# Patient Record
Sex: FEMALE | Race: WHITE | Employment: UNEMPLOYED | URBAN - METROPOLITAN AREA
[De-identification: names, ages, dates, MRNs, and addresses within clinical notes are randomized per-mention and may not be internally consistent; named-entity substitution may affect disease eponyms.]

---

## 2019-03-29 ENCOUNTER — HOSPITAL ENCOUNTER (EMERGENCY)
Facility: CLINIC | Age: 44
Discharge: HOME | End: 2019-03-29
Attending: EMERGENCY MEDICINE | Admitting: EMERGENCY MEDICINE
Payer: COMMERCIAL

## 2019-03-29 VITALS
BODY MASS INDEX: 28.35 KG/M2 | OXYGEN SATURATION: 100 % | HEART RATE: 86 BPM | RESPIRATION RATE: 18 BRPM | DIASTOLIC BLOOD PRESSURE: 71 MMHG | TEMPERATURE: 97.8 F | WEIGHT: 160 LBS | SYSTOLIC BLOOD PRESSURE: 127 MMHG | HEIGHT: 63 IN

## 2019-03-29 DIAGNOSIS — F41.9 ANXIETY: ICD-10-CM

## 2019-03-29 PROCEDURE — 25000132 ZZH RX MED GY IP 250 OP 250 PS 637: Performed by: EMERGENCY MEDICINE

## 2019-03-29 PROCEDURE — 99283 EMERGENCY DEPT VISIT LOW MDM: CPT

## 2019-03-29 RX ORDER — HYDROXYZINE HYDROCHLORIDE 25 MG/1
50 TABLET, FILM COATED ORAL ONCE
Status: COMPLETED | OUTPATIENT
Start: 2019-03-29 | End: 2019-03-29

## 2019-03-29 RX ORDER — HYDROXYZINE HYDROCHLORIDE 25 MG/1
25-50 TABLET, FILM COATED ORAL EVERY 6 HOURS PRN
Qty: 20 TABLET | Refills: 0 | Status: SHIPPED | OUTPATIENT
Start: 2019-03-29

## 2019-03-29 RX ADMIN — HYDROXYZINE HYDROCHLORIDE 50 MG: 25 TABLET ORAL at 04:42

## 2019-03-29 ASSESSMENT — ENCOUNTER SYMPTOMS
NERVOUS/ANXIOUS: 1
COUGH: 0
CHILLS: 0
FEVER: 0

## 2019-03-29 ASSESSMENT — MIFFLIN-ST. JEOR: SCORE: 1349.89

## 2019-03-29 NOTE — ED AVS SNAPSHOT
Emergency Department  [address] [place] [address]  Phone:  [phone]  Fax:  [phone]                                    Veena Squires   MRN: [id number]    Department:   Emergency Department   Date of Visit:  3/29/2019           After Visit Summary Signature Page    I have received my discharge instructions, and my questions have been answered. I have discussed any challenges I see with this plan with the nurse or doctor.    ..........................................................................................................................................  Patient/Patient Representative Signature      ..........................................................................................................................................  Patient Representative Print Name and Relationship to Patient    ..................................................               ................................................  Date                                   Time    ..........................................................................................................................................  Reviewed by Signature/Title    ...................................................              ..............................................  Date                                               Time          22EPIC Rev 08/18

## 2019-03-29 NOTE — ED PROVIDER NOTES
"  History     Chief Complaint:    Anxiety      HPI   Veena Squires is a 44 year old female with a history of anxiety, bipolar 1 disorder, depression, PTSD, and multiple personalities who presents to the ED for evaluation of anxiety. The patient states that she has recently increased her Olanzapine dosage and is now taking it twice a day. She also started taking Paxil two days ago and has been experiencing increased anxiety since then in addition to \"weird thoughts.\" She also complains that for several months she has been getting sick when she goes outside in addition to feeling light headed and developing a metallic taste in her mouth because of \"chem trails.\" She otherwise feels fine when she is indoors. She denies any suicidal ideation, homicidal ideation, cough, fever, chills, or recent illnesses.     Allergies:  The patient has no known drug allergies.    Medications:    Gabapentin  Trazodone  Hydroxyzine  Olanzapine    Past Medical History:    Anxiety  Bipolar 1 disorder  Depression  Multiple personality   PTSD    Past Surgical History:    Lip lift    Family History:    No past pertinent family history.    Social History:  Negative for tobacco use.  Negative for alcohol use.  Marital Status:  Single [1]     Review of Systems   Constitutional: Negative for chills and fever.   Respiratory: Negative for cough.    Psychiatric/Behavioral: Negative for suicidal ideas. The patient is nervous/anxious.    All other systems reviewed and are negative.        Physical Exam   First Vitals:  BP: 127/71  Pulse: 86  Temp: 97.8  F (36.6  C)  Resp: 18  Height: 160 cm (5' 3\")  Weight: 72.6 kg (160 lb)  SpO2: 100 %      Physical Exam  Constitutional:  Appears well-developed and well-nourished. Cooperative. Awake alert. Good eye contact. Answers questions appropriately. Mildly disheveled.   HENT:   Head:    Atraumatic.   Mouth/Throat:   Oropharynx is without erythema or exudate and mucous     membranes are moist.   Eyes: " "   Conjunctivae normal and EOM are normal.      Pupils are equal, round, and reactive to light.   Neck:    Normal range of motion. Neck supple.   Cardiovascular:  Normal rate, regular rhythm, normal heart sounds and radial and    dorsalis pedis pulses are 2+ and symmetric.    Pulmonary/Chest:  Effort normal and breath sounds normal.   Abdominal:   Soft. Bowel sounds are normal.      No splenomegaly or hepatomegaly. No tenderness. No rebound.   Musculoskeletal:  Normal range of motion. No edema and no tenderness.   Neurological:  Alert. Normal strength. No cranial nerve deficit. Gait normal.   Skin:    Skin is warm and dry.   Psychiatric:   Normal speech. Disheveled. Good eye contact. Normal mood and flat affect. Does not appear significantly anxious.     Emergency Department Course   Interventions:  0442 Atarax 50 mg PO    Emergency Department Course:  Nursing notes and vitals reviewed. (0404) I performed an exam of the patient as documented above.     Medicine administered as documented above.     I rechecked the patient and discussed the results of her workup thus far.     Findings and plan explained to the Patient. Patient discharged home with instructions regarding supportive care, medications, and reasons to return. The importance of close follow-up was reviewed. The patient was prescribed Atarax.    Impression & Plan    Medical Decision Making:  Patient presents complaining of anxiety that has worsened over the past couple of days since she started taking Paxil in addition to her other psych medications. She denies any new suicidal or homicidal ideation. She said she is not having any hallucinations. She does seem to have a fixed delusion about air planes spraying chemicals into the air, and she reports that when she sees \"chem trails\" in the [place] it causes her to cough and develop a metallic taste in her mouth. This is something that she has been dealing with for many months. It does not seem to be an acute " psychosis and she has been working on it with her psychiatrist. I don't think further emergent work up of her delusion is indicated. She declines the need to talk to psychiatry.     Here in the ED she speaks clearly and answers my questions appropriately. She is pleasant and cooperative. She does not seem to be in a panic currently. I told her we can given medications to help ease anxiety symptoms until her system starts to become accustomed to the Paxil. She thinks this is reasonable to try. She is given some Atarax here and a prescription for Atarax for home. She will call her psychiatrist tomorrow to discuss her symptoms. At this point she does not want to discontinue because she had been having some increasing problems with depression and anxiety and that was why this medication was started.    She contracts for safety and agrees to return to the ED for any concerning symptoms. She left in the ED in good condition.       Diagnosis:    ICD-10-CM    1. Anxiety F41.9        Disposition:  discharged to home    Discharge Medications:     Medication List      Started    hydrOXYzine 25 MG tablet  Commonly known as:  ATARAX  25-50 mg, Oral, EVERY 6 HOURS PRN          Scribe Disclosure:  I,  Luis Smith, am serving as a scribe on 3/29/2019 at 4:04 AM to personally document services performed by Jeremy Genao MD based on my observations and the provider's statements to me.          Luis Smith  3/29/2019    EMERGENCY DEPARTMENT       Jeremy Genao MD  03/30/19 0711

## 2019-09-17 ENCOUNTER — HOSPITAL ENCOUNTER (EMERGENCY)
Facility: CLINIC | Age: 44
Discharge: HOME | End: 2019-09-17
Attending: EMERGENCY MEDICINE | Admitting: EMERGENCY MEDICINE
Payer: COMMERCIAL

## 2019-09-17 VITALS
TEMPERATURE: 98.2 F | SYSTOLIC BLOOD PRESSURE: 124 MMHG | HEART RATE: 78 BPM | DIASTOLIC BLOOD PRESSURE: 92 MMHG | RESPIRATION RATE: 16 BRPM | OXYGEN SATURATION: 99 %

## 2019-09-17 DIAGNOSIS — F43.10 PTSD (POST-TRAUMATIC STRESS DISORDER): ICD-10-CM

## 2019-09-17 DIAGNOSIS — F41.9 ANXIETY: ICD-10-CM

## 2019-09-17 LAB — INTERPRETATION ECG - MUSE: NORMAL

## 2019-09-17 PROCEDURE — 93005 ELECTROCARDIOGRAM TRACING: CPT

## 2019-09-17 PROCEDURE — 99283 EMERGENCY DEPT VISIT LOW MDM: CPT

## 2019-09-17 PROCEDURE — 25000132 ZZH RX MED GY IP 250 OP 250 PS 637: Performed by: EMERGENCY MEDICINE

## 2019-09-17 RX ORDER — LORAZEPAM 0.5 MG/1
0.5 TABLET ORAL ONCE
Status: COMPLETED | OUTPATIENT
Start: 2019-09-17 | End: 2019-09-17

## 2019-09-17 RX ADMIN — LORAZEPAM 0.5 MG: 0.5 TABLET ORAL at 03:29

## 2019-09-17 ASSESSMENT — ENCOUNTER SYMPTOMS
HALLUCINATIONS: 1
PALPITATIONS: 1
NERVOUS/ANXIOUS: 1

## 2019-09-17 NOTE — ED AVS SNAPSHOT
[facility] Emergency Department  [address]  [facility] [address]  Phone:  [phone]  Fax:  [phone]                                    Veena Squires   MRN: [id number]    Department:  [facility] Emergency Department   Date of Visit:  9/17/2019           After Visit Summary Signature Page    I have received my discharge instructions, and my questions have been answered. I have discussed any challenges I see with this plan with the nurse or doctor.    ..........................................................................................................................................  Patient/Patient Representative Signature      ..........................................................................................................................................  Patient Representative Print Name and Relationship to Patient    ..................................................               ................................................  Date                                   Time    ..........................................................................................................................................  Reviewed by Signature/Title    ...................................................              ..............................................  Date                                               Time          22EPIC Rev 08/18

## 2019-09-17 NOTE — ED TRIAGE NOTES
Pt states she has a hx of anxiety, depression, and PTSD from her childhood.  Pt states she was recently in [place] and got lost and\ is feeling very anxious about that.  Also recently had a medication dose adjustment which is making her feel anxious.  Pt also states she is having heart palpitations.

## 2019-09-17 NOTE — ED PROVIDER NOTES
"  History     Chief Complaint:  Anxiety    HPI   Veena Squires is a 44 year old female with a history of anxiety, depression and PTSD who presents with anxiety. The patient says that she has been having anxiety for some time now, but it has gotten worse recently. She says that the anxiety got worse after having flashbacks of getting lost in [place] recently and childhood trauma. She says that her anxiety/depression medications got increased recently, but she is unable to remember which one. She endorses the use of gabapentin, olanzapine, hydroxyzine, and trazodone. She also notes that her heart was \"beating funny\". She says that she has been having some auditory hallucinations, but they have not been telling her to harm herself or others. The patient denies any visual hallucinations or thoughts of self harm because she is \"too scared to do that\". She also notes that she would prefer to \"not wake up\" but again states she'd never act on this and has a good relationship with her mental health team who provide good care.     Allergies:  No Known Drug Allergies     Medications:    Atarax   Trazodone  Gabapentin   Olanzapine     Past Medical History:    Anxiety  Bipolar 1 disorder  Depressive disorder  Multiple personality  PTSD  Female hypogonadism  IBS  Vaginal stenosis    Past Surgical History:    Past Surgical History:   Procedure Laterality Date     COSMETIC SURGERY - lip lift  2009     MAMMOPLASTY AUGMENTATION       VAGINOPLASTY       Family History:    Family history reviewed. No pertinent family history.     Social History:  Smoking Status: Never Smoker  Alcohol Use: Negative  Drug Use: Negative  PCP: Shannon Vogel   Marital Status:  Single      Review of Systems   Cardiovascular: Positive for palpitations.   Psychiatric/Behavioral: Positive for hallucinations. Negative for self-injury. The patient is nervous/anxious.    All other systems reviewed and are negative.      Physical Exam     Patient Vitals " for the past 24 hrs:  BP Temp Temp src Pulse Heart Rate Resp SpO2   124/92 98.2  F (36.8  C) Oral 78 78 16 98 %        Physical Exam  Nursing note and vitals reviewed.  Constitutional: Cooperative. Patient is somewhat tangential, but redirectable.   HENT:   Mouth/Throat: Mucous membranes are normal.   Cardiovascular: Normal rate, regular rhythm and normal heart sounds.  No murmur.  Pulmonary/Chest: Effort normal and breath sounds normal. No respiratory distress. No wheezes. No rales.   Abdominal: Normal appearance  Neurological: Alert. Oriented x4.  Gait and strength normal.   Skin: Skin is warm and dry. No rash noted.   Psychiatric: Anxious mood and affect.  No suicidal ideation or homicidal. She describes chronic auditory hallucinations, good insight.    Emergency Department Course     Interventions:  0329 Ativan 0.5 mg Oral     Emergency Department Course:    0302 Nursing notes and vitals reviewed.    0305 I performed an exam of the patient as documented above.     0405 Patient rechecked and updated.  Patient is feeling better and wants to go home.     0425 The patient is discharged to home.     Impression & Plan    Medical Decision Making:  Veena Squires is a 44 year old female with the above history who presents with anxiety symptoms. She has a complex medical history and what sounds like stable psychosis symptoms that are chronic. She interestingly has good insight into this acknowledging that the auditory hallucinations are always there. There is no command hallucinations. She clearly denies thoughts of intent to commit self harm. She is depressed but seeing a therapist. She does not meet criteria for involuntary hold as she is able to contract for safety here. Sounds like she had some flashbacks and PTSD secondary to getting lost in [place] during a recent road trip as well as being pulled over by police. After a small dose of oral Ativan she feels much better and wants to go home. I think this is  reasonable. The patient is appreciative of the care she received and will be discharged home in stable condition.         Diagnosis:    ICD-10-CM    1. Anxiety F41.9    2. PTSD (post-traumatic stress disorder) F43.10      Disposition:   Findings and plan explained to the Patient. Patient discharged home with instructions regarding supportive care, medications, and reasons to return. The importance of close follow-up was reviewed.     Discharge Medications:  No discharge medications.      Scribe Disclosure:  MYA, Eduardo Shah, am serving as a scribe at 3:04 AM on 9/17/2019 to document services personally performed by Lauri Ku MD based on my observations and the provider's statements to me.      [facility] EMERGENCY DEPARTMENT       Lauri Ku MD  09/17/19 0600

## 2020-02-11 ENCOUNTER — HOSPITAL ENCOUNTER (EMERGENCY)
Facility: CLINIC | Age: 45
Discharge: HOME | End: 2020-02-11
Attending: EMERGENCY MEDICINE | Admitting: EMERGENCY MEDICINE
Payer: COMMERCIAL

## 2020-02-11 VITALS — OXYGEN SATURATION: 99 % | TEMPERATURE: 98 F | HEART RATE: 92 BPM | RESPIRATION RATE: 18 BRPM

## 2020-02-11 DIAGNOSIS — F41.9 ANXIETY: ICD-10-CM

## 2020-02-11 DIAGNOSIS — R44.0 AUDITORY HALLUCINATION: ICD-10-CM

## 2020-02-11 PROCEDURE — 90791 PSYCH DIAGNOSTIC EVALUATION: CPT

## 2020-02-11 PROCEDURE — 25000132 ZZH RX MED GY IP 250 OP 250 PS 637: Performed by: EMERGENCY MEDICINE

## 2020-02-11 PROCEDURE — 99285 EMERGENCY DEPT VISIT HI MDM: CPT | Mod: 25

## 2020-02-11 RX ORDER — LORAZEPAM 1 MG/1
1 TABLET ORAL ONCE
Status: COMPLETED | OUTPATIENT
Start: 2020-02-11 | End: 2020-02-11

## 2020-02-11 RX ADMIN — LORAZEPAM 1 MG: 1 TABLET ORAL at 03:37

## 2020-02-11 ASSESSMENT — ENCOUNTER SYMPTOMS
NERVOUS/ANXIOUS: 1
HALLUCINATIONS: 1

## 2020-02-11 NOTE — ED PROVIDER NOTES
[facility] ED Behavioral Health Handoff Note:       Brief HPI:  This is a 45 year old female signed out to me by Dr. Crowley.  See initial ED Provider note for details of the presentation. Patient presented voluntarily with complaints of anxiety and auditory hallucinations.  She reported auditory hallucinations are chronic.  She is pending DEC evaluation    Patient is medically cleared.    The patient is not on a hold.      The patient has not required medication for agitation.      Exam:   Temp:  [98  F (36.7  C)] 98  F (36.7  C)  Pulse:  [109] 109  Heart Rate:  [109] 109  Resp:  [16-18] 16  SpO2:  [99 %] 99 %    General: Resting comfortably   Head:  The scalp, face, and head appear normal  Eyes:  The pupils are normal    Conjunctivae and sclera appear normal  ENT:    The nose is normal  Neck:  Normal range of motion  MSK:  Moves all extremities equally  Skin:  No rash or lesions noted.  Neuro:  Speech is normal and fluent  Psych:  Awake. Alert.  Blunt affect       ED Course:    There were no significant events while under my care.  Patient was evaluated by DEC.  She contracted for safety.  Patient denies any suicidal, homicidal ideations.  She reports auditory hallucinations are chronic and seem to be exacerbated when she does not take her Latuda at the appropriate time of day.  She states [birth year] her birthday yesterday and feels her dose was delayed. She feels comfortable with plans for close outpatient follow-up.  She reports extensive outpatient services as well as a psychiatry follow-up within the next week.  She does not appear decompensated from a psychiatric standpoint at this time or to be held against her well.  Return precautions discussed.    Impression:    ICD-10-CM    1. Anxiety F41.9    2. Auditory hallucination R44.0        Plan:    Discharged      Julieth Vega, DO  2/11/2020   [facility] EMERGENCY DEPARTMENT       Julieth Vega, DO  02/11/20 0733

## 2020-02-11 NOTE — ED TRIAGE NOTES
"Here for anxiety for last couple days associated auditory hallucinations calling patient \"names,\" and \"can't get out of my head.\" Per patient, recently have medications changes and MD trailing dario with patient. Denies any SI/HI. ABCs intact.   "

## 2020-02-11 NOTE — ED AVS SNAPSHOT
[facility] Emergency Department  [address]  [facility] [address]  Phone:  [phone]  Fax:  [phone]                                    Veena Squires   MRN: [id number]    Department:  [facility] Emergency Department   Date of Visit:  2/11/2020           After Visit Summary Signature Page    I have received my discharge instructions, and my questions have been answered. I have discussed any challenges I see with this plan with the nurse or doctor.    ..........................................................................................................................................  Patient/Patient Representative Signature      ..........................................................................................................................................  Patient Representative Print Name and Relationship to Patient    ..................................................               ................................................  Date                                   Time    ..........................................................................................................................................  Reviewed by Signature/Title    ...................................................              ..............................................  Date                                               Time          22EPIC Rev 08/18

## 2020-02-11 NOTE — ED NOTES
"Pt pacing around the room, and continues to walk around the hallways. Pt is not aggressive, but unable to stay in room due to \"closterphobia\".  "

## 2020-02-11 NOTE — ED PROVIDER NOTES
History     Chief Complaint:  Anxiety       The history is provided by the patient.      Veena Squires is a 45 year old female with a history of anxiety, bipolar I disorder, PTSD, multiple personality and depressive disorder who presents for evaluation of auditory hallucinations and anxiousness worsening today. She states that she denies any suicidal ideations or self injury, and notes that she recently started a 20mg trial of Latuda. She presents today concerned that she is additionally anxious.    Allergies:  No Known Drug Allergies    Medications:    Gabapentin  Hydroxyzine  Olanzapine  Trazodone    Past Medical History:    Anxiety   Bipolar 1 disorder   Depressive disorder   Multiple personality   PTSD (post-traumatic stress disorder)     Past Surgical History:    Lip lift  Mammoplasty augmentation  Vaginoplasty    Family History:    History reviewed. No pertinent family history.    Social History:  The patient presents to the ED alone.  Smoking Status: Never Smoker  Alcohol Use: No  Drug Use: No  PCP: Shannon Vogel     Review of Systems   Psychiatric/Behavioral: Positive for hallucinations (Auditory). Negative for self-injury and suicidal ideas. The patient is nervous/anxious.    All other systems reviewed and are negative.      Physical Exam     Patient Vitals for the past 24 hrs:   Temp Temp src Pulse Heart Rate Resp SpO2   02/11/20 0503 -- -- -- -- 16 --   02/11/20 0318 98  F (36.7  C) Oral 109 109 18 99 %       Physical Exam  Constitutional:  Oriented to person, place, and time. Blunted affect.  HENT:   Head:    Normocephalic.   Mouth/Throat:   Oropharynx is clear and moist.   Eyes:    EOM are normal. Pupils are equal, round, and reactive to light.   Neck:    Neck supple.   Musculoskeletal:  Normal range of motion.   Neurological:   Alert and oriented to person, place, and time.           Moves all 4 extremities spontaneously    Skin:    No rash noted. No pallor.   Psych:   Blunted affect  with auditory hallucinations. Denies suicidal ideation.     Emergency Department Course     Interventions:  0337 Ativan 1mg PO    Emergency Department Course:  Past medical records, nursing notes, and vitals reviewed.    0323 I performed an exam of the patient as documented above.     0345 I rechecked the patient as she was expressing her desire to leave prior to DEC consult. I spoke with her and together we agreed that waiting for DEC may be beneficial.    The patient was signed out to Dr. Vega, oncoming ED physician, pending DEC consultation.    I personally answered all related questions prior to sign out.     Impression & Plan     Medical Decision Making:  Veena Squires is a 45 year old female with a history of bipolar disorder and auditory hallucinations who presents here with increased auditory hallucinations and anxiety. She denies that the hallucinations are telling her to harm herself in any way, and denies any suicidal ideations. She had significant improvement after Ativan given here. Certainly, this may be influenced by medications that she is currently on. Patient is here voluntarily awaiting DEC consultation to help expedite close follow up. Case has been discussed with and discussed with Dr. Vega who will follow up with DEC recommendation.     Diagnosis:    ICD-10-CM    1. Anxiety F41.9    2. Auditory hallucination R44.0        Disposition:  Signed out to Dr. Vega, pending DEC consultation.    Scribe Disclosure:  I, Adrien Solorio, am serving as a scribe at 3:23 AM on 2/11/2020 to document services personally performed by Robert Crowely MD based on my observations and the provider's statements to me.      Robert Crowley MD  02/11/20 0546

## 2020-02-11 NOTE — DISCHARGE INSTRUCTIONS
Discharge Instructions  Mental Health Concerns    You were seen today for mental health concerns, such as depression, severe anxiety, or suicidal thinking. Your doctor feels that you do not require hospitalization at this time. However, your symptoms may become worse, and you may need to return to the Emergency Department. Most treatments of depression and suicidal thoughts are a process rather than a single intervention.  Medications and counseling can take several weeks or more to help.  It is important to follow up as discussed with your family doctor or counselor.      By accepting these discharge instructions:  You promise to not harm yourself or others.  You agree that if you feel you are becoming unable to keep that promise, you will do something to help yourself before you do anything to harm yourself or others.   You agree to keep any safety plan arranged on your visit here today.  You agree to take any medication prescribed or recommended by your doctor.  If you are getting worse, you can contact a friend or a family member, contact your counselor or family doctor, contact a crisis line, or other options discussed with the doctor or therapist today.  At any time, you can call 911 and return to the emergency department for more help.  You understand that follow-up is essential to your treatment, and you will make and keep appointments recommended on your visit today.    How to improve your mental health and prevent suicide:  Involve others by letting family, friends, counselors know.  Do not isolate yourself.  Avoid alcohol or drugs. Remove weapons, poisons from your home.  Try to stick to routines for eating, sleeping and getting regular exercise.    Try to get into sunlight. Bright natural light not only treats seasonal affective disorder but also depression.  Increase safe activities that you enjoy.    If you feel worse, contact [phone], or call 911, or your family doctor/counselor for additional  assistance.    If you were given a prescription for medicine here today, be sure to read all of the information (including the package insert) that comes with your prescription.  This will include important information about the medicine, its side effects, and any warnings that you need to know about.  The pharmacist who fills the prescription can provide more information and answer questions you may have about the medicine.  If you have questions or concerns that the pharmacist cannot address, please call or return to the Emergency Department.     Opioid Medication Information    Pain medications are among the most commonly prescribed medicines, so we are including this information for all our patients. If you did not receive pain medication or get a prescription for pain medicine, you can ignore it.     You may have been given a prescription for an opioid (narcotic) pain medicine and/or have received a pain medicine while here in the Emergency Department. These medicines can make you drowsy or impaired. You must not drive, operate dangerous equipment, or engage in any other dangerous activities while taking these medications. If you drive while taking these medications, you could be arrested for DUI, or driving under the influence. Do not drink any alcohol while you are taking these medications.     Opioid pain medications can cause addiction. If you have a history of chemical dependency of any type, you are at a higher risk of becoming addicted to pain medications.  Only take these prescribed medications to treat your pain when all other options have been tried. Take it for as short a time and as few doses as possible. Store your pain pills in a secure place, as they are frequently stolen and provide a dangerous opportunity for children or visitors in your house to start abusing these powerful medications. We will not replace any lost or stolen medicine.  As soon as your pain is better, you should flush all your  remaining medication.     Many prescription pain medications contain Tylenol  (acetaminophen), including Vicodin , Tylenol #3 , Norco , Lortab , and Percocet .  You should not take any extra pills of Tylenol  if you are using these prescription medications or you can get very sick.  Do not ever take more than 3000 mg of acetaminophen in any 24 hour period.    All opioids tend to cause constipation. Drink plenty of water and eat foods that have a lot of fiber, such as fruits, vegetables, prune juice, apple juice and high fiber cereal.  Take a laxative if you don t move your bowels at least every other day. Miralax , Milk of Magnesia, Colace , or Senna  can be used to keep you regular.      Remember that you can always come back to the Emergency Department if you are not able to see your regular doctor in the amount of time listed above, if you get any new symptoms, or if there is anything that worries you.